# Patient Record
Sex: MALE | URBAN - METROPOLITAN AREA
[De-identification: names, ages, dates, MRNs, and addresses within clinical notes are randomized per-mention and may not be internally consistent; named-entity substitution may affect disease eponyms.]

---

## 2021-06-01 ENCOUNTER — RECORDS - HEALTHEAST (OUTPATIENT)
Dept: ADMINISTRATIVE | Facility: CLINIC | Age: 56
End: 2021-06-01

## 2025-07-02 ENCOUNTER — TRANSCRIBE ORDERS (OUTPATIENT)
Dept: OTHER | Age: 60
End: 2025-07-02

## 2025-07-02 ENCOUNTER — PATIENT OUTREACH (OUTPATIENT)
Dept: ONCOLOGY | Facility: CLINIC | Age: 60
End: 2025-07-02
Payer: COMMERCIAL

## 2025-07-02 DIAGNOSIS — C84.A0 CUTANEOUS T-CELL LYMPHOMA (H): Primary | ICD-10-CM

## 2025-07-02 NOTE — PROGRESS NOTES
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   Dermatology   Rational for use of [facility]:   SECOND OPINION   Who is requesting?   PROVIDER REQUEST(A DICTATED PHYSICIAN SUMMARY MAY BE REQUESTED)   Appointment Urgency?   Non-Urgent (as schedule permits)   Reason for visit?   Atypical T- Cell neoplasm   Preferred [facility] Physician?   Dr. Kuhn     Records Location: [facility] Everywhere     Records Needed:     ?    Additional testing needed prior to consult:     ?    Referral updates and Plan:     07/02/2025 1:42 PM -  Referral received and reviewed. Discussed with patient.  We resolved that he I supposed to be seeing Dr. Kuhn at [facility] in Dermatology for a second opinion per note on 6/30.  Requested referral be re-transcribed.     Pari Joyner, MOIRAN, RN, OCN  Hematology/Oncology Nurse Navigator  [facility]  [phone] / [phone]

## 2025-08-12 ENCOUNTER — TELEPHONE (OUTPATIENT)
Dept: DERMATOLOGY | Facility: CLINIC | Age: 60
End: 2025-08-12